# Patient Record
Sex: FEMALE
[De-identification: names, ages, dates, MRNs, and addresses within clinical notes are randomized per-mention and may not be internally consistent; named-entity substitution may affect disease eponyms.]

---

## 2022-09-06 PROBLEM — Z00.00 ENCOUNTER FOR PREVENTIVE HEALTH EXAMINATION: Status: ACTIVE | Noted: 2022-09-06

## 2022-09-07 ENCOUNTER — APPOINTMENT (OUTPATIENT)
Dept: OTOLARYNGOLOGY | Facility: CLINIC | Age: 37
End: 2022-09-07

## 2022-09-07 VITALS
WEIGHT: 4 LBS | DIASTOLIC BLOOD PRESSURE: 75 MMHG | BODY MASS INDEX: 0.73 KG/M2 | SYSTOLIC BLOOD PRESSURE: 106 MMHG | HEART RATE: 73 BPM | HEIGHT: 62 IN

## 2022-09-07 DIAGNOSIS — H81.12 BENIGN PAROXYSMAL VERTIGO, LEFT EAR: ICD-10-CM

## 2022-09-07 DIAGNOSIS — H93.13 TINNITUS, BILATERAL: ICD-10-CM

## 2022-09-07 PROCEDURE — 99203 OFFICE O/P NEW LOW 30 MIN: CPT

## 2022-09-07 PROCEDURE — 99243 OFF/OP CNSLTJ NEW/EST LOW 30: CPT

## 2022-09-07 PROCEDURE — 92550 TYMPANOMETRY & REFLEX THRESH: CPT

## 2022-09-07 PROCEDURE — 92557 COMPREHENSIVE HEARING TEST: CPT

## 2022-09-07 NOTE — HISTORY OF PRESENT ILLNESS
[de-identified] : Masoud Alvarado is a 36 yo female who was referred by Dr. Ruvalcaba for evaluation of dizziness. She states that it began 10-15 days ago. It was occurring daily but has improved. It would occur when getting up from a seated positoin. She notes that she felt like the room was spinning but only for a few seconds. She notes intermittent bilateral nonpulsatlie tinnitus. She denies otalgia, otorrhea, or hearing change. She denies fevers or chills. She denies history of recurrent ear infections. She denies family history of early onset hearing loss or occupational noise exposure.

## 2022-09-07 NOTE — ASSESSMENT
[FreeTextEntry1] : Masoud Alvarado presents for evaluation of vertigo. Based on her history, she has benign paroxysmal positional vertigo. [place] Hallpike was positive on the left. She notes history of intermittent tinnitus. Audiogram was performed and reviewed, showing type A tymps AU and normal hearing AU. Will refer for vestibular rehab.\par \par - [facility]\par - Follow up prn

## 2022-09-07 NOTE — PHYSICAL EXAM
[Midline] : trachea located in midline position [] : [place]-Hallpike test is positive [Normal] : no rashes [de-identified] : Positive on left.

## 2022-09-07 NOTE — CONSULT LETTER
[Dear  ___] : Dear  [unfilled], [Consult Letter:] : I had the pleasure of evaluating your patient, [unfilled]. [Please see my note below.] : Please see my note below. [Consult Closing:] : Thank you very much for allowing me to participate in the care of this patient.  If you have any questions, please do not hesitate to contact me. [Sincerely,] : Sincerely, [FreeTextEntry3] : Sidney Clark M.D.